# Patient Record
(demographics unavailable — no encounter records)

---

## 2025-06-21 NOTE — PHYSICAL EXAM
[Normal] : moves all extremities, no focal deficits, normal speech [de-identified] : .ca  No carotid bruits auscultated bilaterally.

## 2025-06-21 NOTE — HISTORY OF PRESENT ILLNESS
[FreeTextEntry1] : 75 year old female with PMHx of HTN, HLD, DM, former smoker, previous VTE (PE provoked from long flight to [place]-2017, no longer on AC) presents for a cardiac evaluation. Patient has noted chest heaviness intermittently at rest. Can come on randomly with no obvious triggers. Occurs across her chest and down her arms, sometime into her hands. No associated dyspnea. Never with exertion.  Patient also notes dizziness/lightheadedness when standing up too quickly.  There is no history of MI, CVA, CHF, or previous coronary intervention.

## 2025-06-21 NOTE — DISCUSSION/SUMMARY
[FreeTextEntry1] : 1. Chest Pain: advising echocardiogram and CTA Coronary Arteries.   2. Dizziness: recommend carotid duplex. But not likely orthostasis.  3. HTN: continue HCTZ 25mg daily. Goal BP less than 130/80. Recommend low salt diet.  4. HLD: continue simvastatin 10mg nightly.  Recommend abdominal aortic duplex in former smoker to screen for AAA.  Office will call with results  Follow up in 6 months. [EKG obtained to assist in diagnosis and management of assessed problem(s)] : EKG obtained to assist in diagnosis and management of assessed problem(s)